# Patient Record
(demographics unavailable — no encounter records)

---

## 2024-11-06 NOTE — ASSESSMENT
[FreeTextEntry1] : An EKG was performed to evaluate for arrhythmia and ischemia.  Hyperlipidemia-- calcium score ordered given reluctance to start a statin  Hypertension--We discussed a goal of /80 or lower in the office. BP  above   goal. Start Amlodipine 5 mg daily  BARTH-- I asked him to stop all smoking and follow-up for a stress-echcoardiogram  Meet new PCP  I encouraged continued risk factor reduction and gradual increase in aerobic activity as tolerated  36   minutes were spent discussing cardiac risk excluding procedure time

## 2024-11-06 NOTE — HISTORY OF PRESENT ILLNESS
[FreeTextEntry1] : 68 year male who comes for Cardiology evaluation of SOB. A close friend had an MI. We discussed that his risk for MACE (major adverse cardiac events such as MI and death) appears to be intermediate.  A statin like Lipitor 40 mg or Crestor 20 mg daily is indicated based on current GDMT (guideline directed medical therapy). He is reluctant to start any medications. He continues to smoke cigars and ski including moguls. He notes SOB that he attributes to his asthma. He denies having any chest pain, dizziness, palpitations, orthopnea, PND or syncope

## 2024-12-04 NOTE — ASSESSMENT
[FreeTextEntry1] : An EKG was performed to evaluate for arrhythmia and ischemia.  Hypertension--We discussed a goal of /80 or lower in the office. BP  above   goal  Add Chlorthalidone.  Labs and Echo in followup  I encouraged continued risk factor reduction and gradual increase in aerobic activity as tolerated   31  minutes were spent discussing cardiac risk excluding procedure time

## 2024-12-04 NOTE — HISTORY OF PRESENT ILLNESS
[FreeTextEntry1] : 68 year male who comes to followup his BP. He had a zero calcium score with calcification of his aortic valve. He denies having any chest pain, SOB, BARTH, dizziness, palpitations, orthopnea, PND or syncope. He saw Pulmonary and notes compliance with Amlodipine